# Patient Record
Sex: MALE | Race: WHITE | Employment: PART TIME | URBAN - METROPOLITAN AREA
[De-identification: names, ages, dates, MRNs, and addresses within clinical notes are randomized per-mention and may not be internally consistent; named-entity substitution may affect disease eponyms.]

---
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
## 2018-05-22 ENCOUNTER — APPOINTMENT (OUTPATIENT)
Dept: OCCUPATIONAL MEDICINE | Age: 18
End: 2018-05-22
Attending: EMERGENCY MEDICINE

## 2019-03-09 ENCOUNTER — HOSPITAL ENCOUNTER (OUTPATIENT)
Age: 19
Discharge: HOME | End: 2019-03-09
Payer: COMMERCIAL

## 2019-03-09 VITALS
TEMPERATURE: 98 F | OXYGEN SATURATION: 97 % | HEART RATE: 78 BPM | BODY MASS INDEX: 40.43 KG/M2 | WEIGHT: 315 LBS | DIASTOLIC BLOOD PRESSURE: 70 MMHG | HEIGHT: 74 IN | SYSTOLIC BLOOD PRESSURE: 164 MMHG | RESPIRATION RATE: 20 BRPM

## 2019-03-09 DIAGNOSIS — L02.91 ABSCESS: Primary | ICD-10-CM

## 2019-03-09 PROCEDURE — 87186 SC STD MICRODIL/AGAR DIL: CPT | Performed by: NURSE PRACTITIONER

## 2019-03-09 PROCEDURE — 87070 CULTURE OTHR SPECIMN AEROBIC: CPT | Performed by: NURSE PRACTITIONER

## 2019-03-09 PROCEDURE — 10060 I&D ABSCESS SIMPLE/SINGLE: CPT

## 2019-03-09 PROCEDURE — 87205 SMEAR GRAM STAIN: CPT | Performed by: NURSE PRACTITIONER

## 2019-03-09 PROCEDURE — 87147 CULTURE TYPE IMMUNOLOGIC: CPT | Performed by: NURSE PRACTITIONER

## 2019-03-09 PROCEDURE — 99214 OFFICE O/P EST MOD 30 MIN: CPT

## 2019-03-09 PROCEDURE — 87077 CULTURE AEROBIC IDENTIFY: CPT | Performed by: NURSE PRACTITIONER

## 2019-03-09 RX ORDER — SULFAMETHOXAZOLE AND TRIMETHOPRIM 800; 160 MG/1; MG/1
1 TABLET ORAL 2 TIMES DAILY
Qty: 14 TABLET | Refills: 0 | Status: SHIPPED | OUTPATIENT
Start: 2019-03-09 | End: 2019-03-16

## 2019-03-09 RX ORDER — CEPHALEXIN 500 MG/1
500 CAPSULE ORAL 4 TIMES DAILY
Qty: 28 CAPSULE | Refills: 0 | Status: SHIPPED | OUTPATIENT
Start: 2019-03-09 | End: 2019-03-16

## 2019-03-09 NOTE — ED PROVIDER NOTES
Patient Seen in: [facility] [place]    History   CC: boil  HPI: Merlene Macdonald 19year old male  who presents to the ER c/o midsternal, upper chest boil which has been present however progressively worsening over the last 3 day [03/09/19 1340]   BP (!) 164/70   Pulse 78   Resp 20   Temp 97.9 °F (36.6 °C)   Temp src    SpO2 97 %   O2 Device None (Room air)       Current:BP (!) 164/70   Pulse 78   Temp 97.9 °F (36.6 °C)   Resp 20   Ht 188 cm (6' 2\")   Wt (!) 152 kg   SpO2 97%   BM Ephraim    Schedule an appointment as soon as possible for a visit in 2 days        Medications Prescribed:  Current Discharge Medication List    START taking these medications    cephALEXin 500 MG Oral Cap  Take 1 capsule (50

## 2019-06-18 ENCOUNTER — OFFICE VISIT (OUTPATIENT)
Dept: OTOLARYNGOLOGY | Facility: CLINIC | Age: 19
End: 2019-06-18
Payer: COMMERCIAL

## 2019-06-18 VITALS
DIASTOLIC BLOOD PRESSURE: 82 MMHG | RESPIRATION RATE: 18 BRPM | TEMPERATURE: 98 F | SYSTOLIC BLOOD PRESSURE: 130 MMHG | WEIGHT: 310 LBS | HEART RATE: 78 BPM | HEIGHT: 74 IN | BODY MASS INDEX: 39.78 KG/M2

## 2019-06-18 DIAGNOSIS — R13.10 DYSPHAGIA, UNSPECIFIED TYPE: Primary | ICD-10-CM

## 2019-06-18 PROCEDURE — 99212 OFFICE O/P EST SF 10 MIN: CPT | Performed by: OTOLARYNGOLOGY

## 2019-06-18 PROCEDURE — 31575 DIAGNOSTIC LARYNGOSCOPY: CPT | Performed by: OTOLARYNGOLOGY

## 2019-06-18 PROCEDURE — 99203 OFFICE O/P NEW LOW 30 MIN: CPT | Performed by: OTOLARYNGOLOGY

## 2019-06-18 RX ORDER — OMEPRAZOLE 40 MG/1
40 CAPSULE, DELAYED RELEASE ORAL DAILY
Qty: 30 CAPSULE | Refills: 2 | Status: SHIPPED | OUTPATIENT
Start: 2019-06-18

## 2019-06-18 RX ORDER — METHYLPREDNISOLONE 4 MG/1
TABLET ORAL
Qty: 1 PACKAGE | Refills: 0 | Status: SHIPPED | OUTPATIENT
Start: 2019-06-18

## 2019-06-18 RX ORDER — MONTELUKAST SODIUM 10 MG/1
10 TABLET ORAL NIGHTLY
Qty: 30 TABLET | Refills: 3 | Status: SHIPPED | OUTPATIENT
Start: 2019-06-18

## 2019-06-18 RX ORDER — AZELASTINE 1 MG/ML
2 SPRAY, METERED NASAL 2 TIMES DAILY
Qty: 1 BOTTLE | Refills: 0 | Status: SHIPPED | OUTPATIENT
Start: 2019-06-18

## 2019-06-18 NOTE — PROGRESS NOTES
Martín Darden is a 19year old male. Patient presents with:  Throat Problem: New pt, per pt no PCP c/o of trouble swallowing solid foods and having build up of saliva x 2 months.        HISTORY OF PRESENT ILLNESS  2012 he presents with a previous histor Drooling. Eyes Negative Blurred vision and vision changes. Respiratory Negative Dyspnea and wheezing. Cardio Negative Chest pain, irregular heartbeat/palpitations and syncope. GI Negative Abdominal pain and diarrhea.    Endocrine Negative Cold intol Verbal consent was obtained. Procedure/risks were explained. Questions were answered. Correct patient identified. Correct side and site confirmed. A topical spray of ). 25% Neosynephrine was sprayed into the nose.     Laryngoscopy:  Flexible Fiberoptic cyst.  - LARYNGOSCOPY,FLEX FIBER,DIAGNOSTIC            Dereje Proctor MD    6/18/2019    4:20 PM

## 2020-06-26 ENCOUNTER — APPOINTMENT (OUTPATIENT)
Dept: GENERAL RADIOLOGY | Age: 20
End: 2020-06-26
Attending: EMERGENCY MEDICINE
Payer: COMMERCIAL

## 2020-06-26 ENCOUNTER — HOSPITAL ENCOUNTER (OUTPATIENT)
Age: 20
Discharge: HOME | End: 2020-06-26
Attending: EMERGENCY MEDICINE
Payer: COMMERCIAL

## 2020-06-26 VITALS
WEIGHT: 315 LBS | RESPIRATION RATE: 20 BRPM | DIASTOLIC BLOOD PRESSURE: 86 MMHG | TEMPERATURE: 99 F | SYSTOLIC BLOOD PRESSURE: 162 MMHG | HEART RATE: 98 BPM | BODY MASS INDEX: 44 KG/M2 | OXYGEN SATURATION: 98 %

## 2020-06-26 DIAGNOSIS — S99.919A ANKLE INJURY, UNSPECIFIED LATERALITY, INITIAL ENCOUNTER: Primary | ICD-10-CM

## 2020-06-26 PROCEDURE — 73610 X-RAY EXAM OF ANKLE: CPT | Performed by: EMERGENCY MEDICINE

## 2020-06-26 PROCEDURE — 99213 OFFICE O/P EST LOW 20 MIN: CPT

## 2020-06-26 NOTE — ED INITIAL ASSESSMENT (HPI)
Patient rolled Left ankle while walking down stairs yesterday  +swelling and tenderness to lateral malleolus  Patient ambulatory using crutches

## 2020-06-26 NOTE — ED PROVIDER NOTES
Patient Seen in: [address] [facility]      History   Patient presents with: Ankle Injury    Stated Complaint: TL- Twisted ankle    HPI    This patient stated he fell down steps yesterday and twisted his left ankle.   Patient has no own crutches     Xr Ankle (min 3 Views), Left (cpt=73610)    Result Date: 6/26/2020  PROCEDURE: XR ANKLE (MIN 3 VIEWS), LEFT (CPT=73610)  COMPARISON: None. INDICATIONS: Left ankle pain post twisting injury x 1 day. TECHNIQUE:  Three views were obtained.

## 2020-11-28 ENCOUNTER — HOSPITAL ENCOUNTER (OUTPATIENT)
Age: 20
Discharge: HOME | End: 2020-11-28
Attending: EMERGENCY MEDICINE
Payer: COMMERCIAL

## 2020-11-28 VITALS
TEMPERATURE: 99 F | OXYGEN SATURATION: 97 % | DIASTOLIC BLOOD PRESSURE: 93 MMHG | SYSTOLIC BLOOD PRESSURE: 174 MMHG | RESPIRATION RATE: 20 BRPM | HEART RATE: 99 BPM

## 2020-11-28 DIAGNOSIS — L02.91 ABSCESS: Primary | ICD-10-CM

## 2020-11-28 PROCEDURE — 99212 OFFICE O/P EST SF 10 MIN: CPT

## 2020-11-28 PROCEDURE — 10061 I&D ABSCESS COMP/MULTIPLE: CPT

## 2020-11-28 PROCEDURE — 99213 OFFICE O/P EST LOW 20 MIN: CPT

## 2020-11-29 NOTE — ED PROVIDER NOTES
Patient Seen in: [facility] [place]      History   Patient presents with:  Abscess    Stated Complaint: Cyst on middle of chest - possibly infected    HPI    22 yo male presents with painful swelling to the chest for two days, worsening. No fever. abscess to the anterior chest wall. Scabbed lesion is present    There is an additional hyperpigmented scaly, dry lesion within a skin fold. Not c/w tinea or bacterial infection. Neurological:      General: No focal deficit present.       Mental Status:

## 2021-11-22 ENCOUNTER — OFFICE VISIT (OUTPATIENT)
Dept: INTERNAL MEDICINE CLINIC | Facility: CLINIC | Age: 21
End: 2021-11-22
Payer: COMMERCIAL

## 2021-11-22 VITALS
SYSTOLIC BLOOD PRESSURE: 144 MMHG | WEIGHT: 315 LBS | DIASTOLIC BLOOD PRESSURE: 100 MMHG | HEIGHT: 74 IN | BODY MASS INDEX: 40.43 KG/M2

## 2021-11-22 DIAGNOSIS — Z00.00 ANNUAL PHYSICAL EXAM: Primary | ICD-10-CM

## 2021-11-22 DIAGNOSIS — G93.0 ARACHNOID CYST: ICD-10-CM

## 2021-11-22 DIAGNOSIS — I10 PRIMARY HYPERTENSION: ICD-10-CM

## 2021-11-22 PROCEDURE — 80061 LIPID PANEL: CPT | Performed by: INTERNAL MEDICINE

## 2021-11-22 PROCEDURE — 90686 IIV4 VACC NO PRSV 0.5 ML IM: CPT | Performed by: INTERNAL MEDICINE

## 2021-11-22 PROCEDURE — 3008F BODY MASS INDEX DOCD: CPT | Performed by: INTERNAL MEDICINE

## 2021-11-22 PROCEDURE — 82570 ASSAY OF URINE CREATININE: CPT | Performed by: INTERNAL MEDICINE

## 2021-11-22 PROCEDURE — 83036 HEMOGLOBIN GLYCOSYLATED A1C: CPT | Performed by: INTERNAL MEDICINE

## 2021-11-22 PROCEDURE — 90471 IMMUNIZATION ADMIN: CPT | Performed by: INTERNAL MEDICINE

## 2021-11-22 PROCEDURE — 80050 GENERAL HEALTH PANEL: CPT | Performed by: INTERNAL MEDICINE

## 2021-11-22 PROCEDURE — 3077F SYST BP >= 140 MM HG: CPT | Performed by: INTERNAL MEDICINE

## 2021-11-22 PROCEDURE — 82043 UR ALBUMIN QUANTITATIVE: CPT | Performed by: INTERNAL MEDICINE

## 2021-11-22 PROCEDURE — 87591 N.GONORRHOEAE DNA AMP PROB: CPT | Performed by: INTERNAL MEDICINE

## 2021-11-22 PROCEDURE — 82306 VITAMIN D 25 HYDROXY: CPT | Performed by: INTERNAL MEDICINE

## 2021-11-22 PROCEDURE — 3080F DIAST BP >= 90 MM HG: CPT | Performed by: INTERNAL MEDICINE

## 2021-11-22 PROCEDURE — 99385 PREV VISIT NEW AGE 18-39: CPT | Performed by: INTERNAL MEDICINE

## 2021-11-22 PROCEDURE — 87491 CHLMYD TRACH DNA AMP PROBE: CPT | Performed by: INTERNAL MEDICINE

## 2021-11-22 PROCEDURE — 83525 ASSAY OF INSULIN: CPT | Performed by: INTERNAL MEDICINE

## 2021-11-22 NOTE — PROGRESS NOTES
Phoebe Benz is a 19year old male.     Chief complaint: annual physical exam       HPI:     Phoebe Benz is a 19year old pleasant male who presents for annual physical exam   BP has been elevated for a while   Does have a BP machine at home   HTN distress  SKIN: no rashes,no suspicious lesions  HEENT: atraumatic, normocephalic,ears and throat are clear  NECK: supple,no adenopathy  LUNGS: clear to auscultation  CARDIO: RRR without murmur  GI: no masses, HSM or tenderness  EXTREMITIES: no cyanosis, c

## 2021-11-25 RX ORDER — ERGOCALCIFEROL 1.25 MG/1
50000 CAPSULE ORAL WEEKLY
Qty: 12 CAPSULE | Refills: 1 | Status: SHIPPED | OUTPATIENT
Start: 2021-11-25 | End: 2022-02-11

## 2021-12-27 ENCOUNTER — TELEPHONE (OUTPATIENT)
Dept: INTERNAL MEDICINE CLINIC | Facility: CLINIC | Age: 21
End: 2021-12-27

## 2021-12-29 ENCOUNTER — TELEPHONE (OUTPATIENT)
Dept: INTERNAL MEDICINE CLINIC | Facility: CLINIC | Age: 21
End: 2021-12-29

## 2021-12-29 NOTE — TELEPHONE ENCOUNTER
patient notified:     MRI approved authorized until feb 5th to be performed at [address] [place] [address] number is [phone]

## 2021-12-31 ENCOUNTER — OFFICE VISIT (OUTPATIENT)
Dept: OPHTHALMOLOGY | Facility: CLINIC | Age: 21
End: 2021-12-31
Payer: COMMERCIAL

## 2021-12-31 DIAGNOSIS — H02.883 MEIBOMIAN GLAND DYSFUNCTION (MGD) OF BOTH EYES: ICD-10-CM

## 2021-12-31 DIAGNOSIS — H52.13 MYOPIA OF BOTH EYES WITH ASTIGMATISM: Primary | ICD-10-CM

## 2021-12-31 DIAGNOSIS — H52.203 MYOPIA OF BOTH EYES WITH ASTIGMATISM: Primary | ICD-10-CM

## 2021-12-31 DIAGNOSIS — H02.886 MEIBOMIAN GLAND DYSFUNCTION (MGD) OF BOTH EYES: ICD-10-CM

## 2021-12-31 PROCEDURE — 92004 COMPRE OPH EXAM NEW PT 1/>: CPT | Performed by: OPHTHALMOLOGY

## 2021-12-31 PROCEDURE — 92015 DETERMINE REFRACTIVE STATE: CPT | Performed by: OPHTHALMOLOGY

## 2021-12-31 NOTE — PATIENT INSTRUCTIONS
Meibomian gland dysfunction (MGD) of both eyes  No treatment. Myopia of both eyes with astigmatism  New glasses Rx given. Suggest update.

## 2021-12-31 NOTE — PROGRESS NOTES
Merlene Macdonald is a 19year old male. HPI:     HPI     New patient here for a complete exam.  Patient complains of difficulty reading street signs. His last eye exam was about 10 years ago.       Last edited by Dakotah Shaikh OT on 12/31/2021  9:07 A 20/60    Dist ph sc 20/25 +1 20/25 -2    Near sc 20/20 20/25          Tonometry ([facility], 9:20 AM)       Right Left    Pressure 17 17          Pupils       Pupils    Right PERRL    Left PERRL          Visual Fields       Left Right     Full Full          Ext 4 years (around 12/31/2025) for Dilated Eye Exam.    12/31/2021  Scribed by: Pablo Malcolm MD

## 2022-01-08 ENCOUNTER — HOSPITAL ENCOUNTER (OUTPATIENT)
Age: 22
Discharge: HOME | End: 2022-01-08
Payer: COMMERCIAL

## 2022-01-08 VITALS
HEART RATE: 104 BPM | TEMPERATURE: 100 F | DIASTOLIC BLOOD PRESSURE: 86 MMHG | OXYGEN SATURATION: 98 % | RESPIRATION RATE: 20 BRPM | SYSTOLIC BLOOD PRESSURE: 168 MMHG

## 2022-01-08 DIAGNOSIS — U07.1 COVID-19: Primary | ICD-10-CM

## 2022-01-08 LAB
S PYO AG THROAT QL: NEGATIVE
SARS-COV-2 RNA RESP QL NAA+PROBE: DETECTED

## 2022-01-08 PROCEDURE — 87880 STREP A ASSAY W/OPTIC: CPT

## 2022-01-08 PROCEDURE — 99213 OFFICE O/P EST LOW 20 MIN: CPT

## 2022-01-08 PROCEDURE — 99212 OFFICE O/P EST SF 10 MIN: CPT

## 2022-01-14 NOTE — ED PROVIDER NOTES
Patient Seen in: [facility] [place]      History   Patient presents with:  Cough/URI    Stated Complaint: Chest pain, cough     Subjective:   HPI    This is a well-appearing 20-year-old who presents with a cough, sore throat, weakness and fatigue whi membrane, ear canal and external ear normal.      Nose: Nose normal.      Mouth/Throat:      Lips: Pink. Mouth: Mucous membranes are moist.      Pharynx: Oropharynx is clear. Uvula midline. Posterior oropharyngeal erythema present.    Eyes:      Myranda Russell available prior medical records for any recent pertinent discharge summaries/testing. This includes but not limited to OP/IP visits, radiology tests , clinical labs tests, EKG's, and medication.        I Updated patient  on all findings, who verbalized unde

## 2022-01-31 ENCOUNTER — OFFICE VISIT (OUTPATIENT)
Dept: INTERNAL MEDICINE CLINIC | Facility: CLINIC | Age: 22
End: 2022-01-31
Payer: COMMERCIAL

## 2022-01-31 VITALS
HEART RATE: 74 BPM | WEIGHT: 315 LBS | HEIGHT: 74 IN | OXYGEN SATURATION: 99 % | SYSTOLIC BLOOD PRESSURE: 132 MMHG | BODY MASS INDEX: 40.43 KG/M2 | DIASTOLIC BLOOD PRESSURE: 90 MMHG

## 2022-01-31 DIAGNOSIS — R74.8 ELEVATED LIVER ENZYMES: ICD-10-CM

## 2022-01-31 DIAGNOSIS — R79.89 ABNORMAL CBC: ICD-10-CM

## 2022-01-31 DIAGNOSIS — I10 PRIMARY HYPERTENSION: Primary | ICD-10-CM

## 2022-01-31 DIAGNOSIS — E55.9 VITAMIN D DEFICIENCY: ICD-10-CM

## 2022-01-31 DIAGNOSIS — E66.01 MORBID OBESITY (HCC): ICD-10-CM

## 2022-01-31 PROCEDURE — 99214 OFFICE O/P EST MOD 30 MIN: CPT | Performed by: INTERNAL MEDICINE

## 2022-01-31 PROCEDURE — 3080F DIAST BP >= 90 MM HG: CPT | Performed by: INTERNAL MEDICINE

## 2022-01-31 PROCEDURE — 3075F SYST BP GE 130 - 139MM HG: CPT | Performed by: INTERNAL MEDICINE

## 2022-01-31 PROCEDURE — 3008F BODY MASS INDEX DOCD: CPT | Performed by: INTERNAL MEDICINE

## 2022-01-31 RX ORDER — ERGOCALCIFEROL 1.25 MG/1
50000 CAPSULE ORAL WEEKLY
Qty: 12 CAPSULE | Refills: 1 | Status: SHIPPED | OUTPATIENT
Start: 2022-01-31 | End: 2022-04-19

## 2022-01-31 NOTE — PROGRESS NOTES
Shawn Russell is a 19year old male.     Chief complaint: follow up on BP     HPI:     Shawn Russell is a 19year old pleasant male who presents for follow up on BP       Had Covid January 7th the first day of symptoms   Feeling back to normal now   N Annual physical exam     Myopia of both eyes with astigmatism     Meibomian gland dysfunction (MGD) of both eyes      REVIEW OF SYSTEMS:   A comprehensive 10 point review of systems was completed.   Pertinent positives and negatives noted in the the HPI

## 2022-01-31 NOTE — PATIENT INSTRUCTIONS
Your High Blood Pressure Risk Factors  Risk factors are things that make you more likely to have a disease or condition. Do you know your risk factors for high blood pressure? You can’t do anything about some risk factors.  But other risk factors are th 7/1/2019    © 7159-7206 [employer]. All rights reserved. This information is not intended as a substitute for professional medical care. Always follow your healthcare professional's instructions.

## 2022-06-05 PROCEDURE — 99284 EMERGENCY DEPT VISIT MOD MDM: CPT

## 2022-06-06 ENCOUNTER — APPOINTMENT (OUTPATIENT)
Dept: GENERAL RADIOLOGY | Facility: HOSPITAL | Age: 22
End: 2022-06-06
Attending: NURSE PRACTITIONER
Payer: COMMERCIAL

## 2022-06-06 ENCOUNTER — HOSPITAL ENCOUNTER (EMERGENCY)
Facility: HOSPITAL | Age: 22
Discharge: HOME | End: 2022-06-06
Payer: COMMERCIAL

## 2022-06-06 ENCOUNTER — APPOINTMENT (OUTPATIENT)
Dept: CT IMAGING | Facility: HOSPITAL | Age: 22
End: 2022-06-06
Attending: NURSE PRACTITIONER
Payer: COMMERCIAL

## 2022-06-06 VITALS
SYSTOLIC BLOOD PRESSURE: 139 MMHG | TEMPERATURE: 99 F | RESPIRATION RATE: 18 BRPM | DIASTOLIC BLOOD PRESSURE: 79 MMHG | OXYGEN SATURATION: 95 % | BODY MASS INDEX: 40.43 KG/M2 | HEIGHT: 74 IN | HEART RATE: 70 BPM | WEIGHT: 315 LBS

## 2022-06-06 DIAGNOSIS — S20.212A CONTUSION OF RIB ON LEFT SIDE, INITIAL ENCOUNTER: ICD-10-CM

## 2022-06-06 DIAGNOSIS — S09.90XA CLOSED HEAD INJURY, INITIAL ENCOUNTER: Primary | ICD-10-CM

## 2022-06-06 DIAGNOSIS — S60.212A CONTUSION OF LEFT WRIST, INITIAL ENCOUNTER: ICD-10-CM

## 2022-06-06 PROCEDURE — 73130 X-RAY EXAM OF HAND: CPT | Performed by: NURSE PRACTITIONER

## 2022-06-06 PROCEDURE — 71101 X-RAY EXAM UNILAT RIBS/CHEST: CPT | Performed by: NURSE PRACTITIONER

## 2022-06-06 PROCEDURE — 70450 CT HEAD/BRAIN W/O DYE: CPT | Performed by: NURSE PRACTITIONER
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
## 2022-08-11 ENCOUNTER — APPOINTMENT (OUTPATIENT)
Dept: GENERAL RADIOLOGY | Age: 22
End: 2022-08-11
Attending: EMERGENCY MEDICINE
Payer: COMMERCIAL

## 2022-08-11 ENCOUNTER — HOSPITAL ENCOUNTER (OUTPATIENT)
Age: 22
Discharge: HOME | End: 2022-08-11
Payer: COMMERCIAL

## 2022-08-11 VITALS
OXYGEN SATURATION: 98 % | SYSTOLIC BLOOD PRESSURE: 153 MMHG | TEMPERATURE: 98 F | HEART RATE: 85 BPM | DIASTOLIC BLOOD PRESSURE: 78 MMHG | RESPIRATION RATE: 18 BRPM

## 2022-08-11 DIAGNOSIS — T14.8XXA MUSCLE STRAIN: ICD-10-CM

## 2022-08-11 DIAGNOSIS — R07.9 ACUTE CHEST PAIN: Primary | ICD-10-CM

## 2022-08-11 DIAGNOSIS — F43.29 STRESS AND ADJUSTMENT REACTION: ICD-10-CM

## 2022-08-11 LAB
#MXD IC: 0.4 X10ˆ3/UL (ref 0.1–1)
BUN BLD-MCNC: 14 MG/DL (ref 7–18)
CHLORIDE BLD-SCNC: 102 MMOL/L (ref 98–112)
CO2 BLD-SCNC: 27 MMOL/L (ref 21–32)
CREAT BLD-MCNC: 0.9 MG/DL
GFR SERPLBLD BASED ON 1.73 SQ M-ARVRAT: 123 ML/MIN/1.73M2 (ref 60–?)
GLUCOSE BLD-MCNC: 108 MG/DL (ref 70–99)
HCT VFR BLD AUTO: 51.2 %
HCT VFR BLD CALC: 53 %
HGB BLD-MCNC: 17.3 G/DL
ISTAT IONIZED CALCIUM FOR CHEM 8: 1.2 MMOL/L (ref 1.12–1.32)
LYMPHOCYTES # BLD AUTO: 1.6 X10ˆ3/UL (ref 1–4)
LYMPHOCYTES NFR BLD AUTO: 20.4 %
MCH RBC QN AUTO: 28.3 PG (ref 26–34)
MCHC RBC AUTO-ENTMCNC: 33.8 G/DL (ref 31–37)
MCV RBC AUTO: 83.8 FL (ref 80–100)
MIXED CELL %: 4.5 %
NEUTROPHILS # BLD AUTO: 6 X10ˆ3/UL (ref 1.5–7.7)
NEUTROPHILS NFR BLD AUTO: 75.1 %
PLATELET # BLD AUTO: 309 X10ˆ3/UL (ref 150–450)
POTASSIUM BLD-SCNC: 4.5 MMOL/L (ref 3.6–5.1)
RBC # BLD AUTO: 6.11 X10ˆ6/UL
SARS-COV-2 RNA RESP QL NAA+PROBE: NOT DETECTED
SODIUM BLD-SCNC: 141 MMOL/L (ref 136–145)
TROPONIN I BLD-MCNC: <0.02 NG/ML
WBC # BLD AUTO: 8 X10ˆ3/UL (ref 4–11)

## 2022-08-11 PROCEDURE — 93005 ELECTROCARDIOGRAM TRACING: CPT

## 2022-08-11 PROCEDURE — 99214 OFFICE O/P EST MOD 30 MIN: CPT

## 2022-08-11 PROCEDURE — 84484 ASSAY OF TROPONIN QUANT: CPT

## 2022-08-11 PROCEDURE — 99215 OFFICE O/P EST HI 40 MIN: CPT

## 2022-08-11 PROCEDURE — 80047 BASIC METABLC PNL IONIZED CA: CPT

## 2022-08-11 PROCEDURE — 93010 ELECTROCARDIOGRAM REPORT: CPT | Performed by: EMERGENCY MEDICINE

## 2022-08-11 PROCEDURE — 85025 COMPLETE CBC W/AUTO DIFF WBC: CPT | Performed by: EMERGENCY MEDICINE

## 2022-08-11 PROCEDURE — 36415 COLL VENOUS BLD VENIPUNCTURE: CPT

## 2022-08-11 PROCEDURE — 71046 X-RAY EXAM CHEST 2 VIEWS: CPT | Performed by: EMERGENCY MEDICINE

## 2022-08-11 RX ORDER — CYCLOBENZAPRINE HCL 10 MG
10 TABLET ORAL 3 TIMES DAILY PRN
Qty: 20 TABLET | Refills: 0 | Status: SHIPPED | OUTPATIENT
Start: 2022-08-11 | End: 2022-08-18

## 2022-08-11 RX ORDER — IBUPROFEN 600 MG/1
600 TABLET ORAL EVERY 6 HOURS PRN
Qty: 20 TABLET | Refills: 0 | Status: SHIPPED | OUTPATIENT
Start: 2022-08-11

## 2022-08-11 NOTE — ED INITIAL ASSESSMENT (HPI)
Pt comes in for evaluation of not feeling well today. Reports that he had some anxiety and sob this am, intermittent mild nausea and chest tenderness to palpation. Pt reports feeling like \"internal bruise\" to L ribcage and axilla. Denies cough, congestion, upset stomach, sob at this time. Reports intermittent chest pains \"for a long time\", and since working out earlier this week. Denies known injury or trauma to chest/ribcage.

## 2022-08-16 ENCOUNTER — OFFICE VISIT (OUTPATIENT)
Dept: PODIATRY CLINIC | Facility: CLINIC | Age: 22
End: 2022-08-16
Payer: COMMERCIAL

## 2022-08-16 DIAGNOSIS — L03.031 PARONYCHIA OF TOE OF RIGHT FOOT: ICD-10-CM

## 2022-08-16 DIAGNOSIS — L60.0 ONYCHOCRYPTOSIS: Primary | ICD-10-CM

## 2022-08-16 PROCEDURE — 99203 OFFICE O/P NEW LOW 30 MIN: CPT | Performed by: PODIATRIST

## 2022-08-16 RX ORDER — AMOXICILLIN AND CLAVULANATE POTASSIUM 875; 125 MG/1; MG/1
1 TABLET, FILM COATED ORAL 2 TIMES DAILY
Qty: 14 TABLET | Refills: 0 | Status: SHIPPED | OUTPATIENT
Start: 2022-08-16

## 2022-09-13 ENCOUNTER — TELEPHONE (OUTPATIENT)
Dept: PODIATRY CLINIC | Facility: CLINIC | Age: 22
End: 2022-09-13

## 2022-09-14 NOTE — TELEPHONE ENCOUNTER
S/w pt and he states he had ingrown toenail procedure scheduled on 8/29 but had to cx d/t other medical concerns but is ready to RS and looking for prior to October d/t insurance change. I informed him unfortunately no openings before oct. Offered 10/12 but he declined and states he will need think about and CB to schedule.

## 2023-08-28 ENCOUNTER — APPOINTMENT (OUTPATIENT)
Dept: OTHER | Facility: HOSPITAL | Age: 23
End: 2023-08-28
Attending: PREVENTIVE MEDICINE

## 2023-09-24 ENCOUNTER — HOSPITAL ENCOUNTER (OUTPATIENT)
Age: 23
Discharge: HOME | End: 2023-09-24
Payer: COMMERCIAL

## 2023-09-24 VITALS
RESPIRATION RATE: 20 BRPM | DIASTOLIC BLOOD PRESSURE: 96 MMHG | SYSTOLIC BLOOD PRESSURE: 160 MMHG | HEART RATE: 72 BPM | OXYGEN SATURATION: 98 % | TEMPERATURE: 98 F

## 2023-09-24 DIAGNOSIS — L03.119 CELLULITIS OF HAND: ICD-10-CM

## 2023-09-24 DIAGNOSIS — R03.0 ELEVATED BLOOD PRESSURE READING: ICD-10-CM

## 2023-09-24 DIAGNOSIS — T63.441A LOCAL REACTION TO BEE STING, ACCIDENTAL OR UNINTENTIONAL, INITIAL ENCOUNTER: Primary | ICD-10-CM

## 2023-09-24 PROCEDURE — 99213 OFFICE O/P EST LOW 20 MIN: CPT

## 2023-09-24 PROCEDURE — 99283 EMERGENCY DEPT VISIT LOW MDM: CPT

## 2023-09-24 RX ORDER — DIPHENHYDRAMINE HCL 25 MG
CAPSULE ORAL
Qty: 40 CAPSULE | Refills: 0 | Status: SHIPPED | OUTPATIENT
Start: 2023-09-24 | End: 2023-09-28

## 2023-09-24 RX ORDER — PREDNISONE 20 MG/1
40 TABLET ORAL DAILY
Qty: 10 TABLET | Refills: 0 | Status: SHIPPED | OUTPATIENT
Start: 2023-09-24 | End: 2023-09-29

## 2023-09-24 RX ORDER — CEPHALEXIN 500 MG/1
500 CAPSULE ORAL 3 TIMES DAILY
Qty: 21 CAPSULE | Refills: 0 | Status: SHIPPED | OUTPATIENT
Start: 2023-09-24 | End: 2023-10-01

## 2023-09-24 NOTE — ED INITIAL ASSESSMENT (HPI)
Patient presents with complaints of allergic reaction to be sting that occurred yesterday. Has taken 3 doses of benadryl 25mg since symptom onset without relief. Right hand noted to have significant swelling and redness.

## (undated) NOTE — LETTER
Date & Time: 3/17/2019, 8:51 AM  Patient: Merlene Macdonald    Dear Merlene Macdonald,    We have received test results pertaining to your visit to [address] [facility] on 3/9/2019 and have not been able to contact you at your telephone number [phone]

## (undated) NOTE — LETTER
December 19, 2017    Patient: Natali Hallman   Date of Visit: 12/19/2017       To Whom It May Concern:    Mariposa Caldwell was seen and treated in our emergency department on 12/19/2017. He can return to work 12/21/17.     If you have any questions or c

## (undated) NOTE — LETTER
Date & Time: 6/6/2022, 2:42 AM  Patient: Fabiana Singh  Encounter Provider(s):    MILKA Worthington       To Whom It May Concern:    Aminah Moore was seen and treated in our department on 6/5/2022.  He may return to work 6/8/2022    If you have any questions or concerns, please do not hesitate to call.        _____________________________  Physician/APC Signature

## (undated) NOTE — ED AVS SNAPSHOT
Candelario Vyas   MRN: [id number]    Department:  [facility] Emergency Department   Date of Visit:  12/19/2017           Disclosure     Insurance plans vary and the physician(s) referred by the ER may not be covered by your plan.  Please contac CARE PHYSICIAN AT ONCE OR RETURN IMMEDIATELY TO THE EMERGENCY DEPARTMENT. If you have been prescribed any medication(s), please fill your prescription right away and begin taking the medication(s) as directed.   If you believe that any of the medications